# Patient Record
Sex: FEMALE | Race: WHITE
[De-identification: names, ages, dates, MRNs, and addresses within clinical notes are randomized per-mention and may not be internally consistent; named-entity substitution may affect disease eponyms.]

---

## 2023-04-17 PROBLEM — Z00.00 ENCOUNTER FOR PREVENTIVE HEALTH EXAMINATION: Status: ACTIVE | Noted: 2023-04-17

## 2023-04-20 ENCOUNTER — APPOINTMENT (OUTPATIENT)
Dept: UROLOGY | Facility: CLINIC | Age: 68
End: 2023-04-20
Payer: COMMERCIAL

## 2023-04-20 VITALS
TEMPERATURE: 97.6 F | WEIGHT: 160 LBS | DIASTOLIC BLOOD PRESSURE: 84 MMHG | BODY MASS INDEX: 30.21 KG/M2 | SYSTOLIC BLOOD PRESSURE: 130 MMHG | HEIGHT: 61 IN | HEART RATE: 86 BPM

## 2023-04-20 PROCEDURE — 99204 OFFICE O/P NEW MOD 45 MIN: CPT

## 2023-04-20 NOTE — HISTORY OF PRESENT ILLNESS
[FreeTextEntry1] : Patient Name: Romana Rosales\par Date of Birth: [birth year]\par Contact Number: [phone]\par ------------------------------------------------------------------------------\par Date of Initial Visit: 23\par Referring Provider/PCP: Dr. Jason Dillard (f. [phone])\par ------------------------------------------------------------------------------\par CC: Microhematuria \par \par History of Present Illness: ROMANA ROSALES is a 68 year old female presents for evaluation of microhematuria. Noted to have 3-10 RBC/HPF on UA in 2023. Negative leukocytes, bacteria and crystals (called PCP office to obtain results, was given verbal). Patient denies any gross hematuria. No change in urinary symptoms, occasional non-bothersome frequency at baseline.\par \par Risk Factors -  \par Cigarette smoking: social cigarette for 2 years as teenage (max 1 cigarette/day)\par History of gross hematuria: no\par LUTS: occasional frequency\par History of kidney stones: no\par Recent UTI: no (though had BV and yeast in vagina a few months ago)\par Recent trauma or strenuous activity: no\par History of occupational exposures (chemical benzene or aromatic amines): no\par Personal history of cancer:  no\par History of cyclophosphamide/ifosfamide chemotherapy: no\par History of pelvic radiation: no\par History of chronic indwelling catheters: no\par Family history of urothelial or other genitourinary cancers: father with prostate cancer, sister breast cancer in 60s treated\par \par \par PMH: anxiety, HLD\par PSH:  x 2\par Family History: father prostate cancer, sister breast cancer 60s, alive\par Social: , , 2 children - 1 passed away from OD, social smoker x 2 years as teenager, no alcohol, no recreational drugs \par Meds: Wellbutrin, rosuvastatin, Zoloft\par Allergies: NKDA\par ROS: no fevers, chills, weight loss

## 2023-04-20 NOTE — ASSESSMENT
[FreeTextEntry1] : \par \par JAYCE LAMAR is a 68 year old female with asymptomatic microhematuria. Per AUA guidelines, the patient is high risk given age.  \par \par  I explained that the likelihood of harboring a urologic malignancy in patients with microscopic hematuria is less than 5%, and this risk level varies considerably based on associated risk factors. We discussed possible etiologies of hematuria including benign (nephrolithiasis, UTI, medical renal disease) and malignant (urothelial cell carcinoma or kidneys, bladder, ureter, renal mass). We discussed risk factors and risk stratification of hematuria. We will proceed with work-up with CT urogram and cystoscopy.\par \par - UA\par - urine culture\par - CT urogram\par - cystoscopy

## 2023-04-20 NOTE — LETTER BODY
[Dear  ___] : Dear  [unfilled], [Courtesy Letter:] : I had the pleasure of seeing your patient, [unfilled], in my office today. [Please see my note below.] : Please see my note below. [Consult Closing:] : Thank you very much for allowing me to participate in the care of this patient.  If you have any questions, please do not hesitate to contact me. [Sincerely,] : Sincerely, [FreeTextEntry3] : Savana Rodriguez MD\par Director of Robotic Education\par [facility]\par \par [email or web address]\par [phone] ([place])\par [phone]  ([facility])

## 2023-04-21 LAB
APPEARANCE: CLEAR
BACTERIA: NEGATIVE /HPF
BILIRUBIN URINE: NEGATIVE
BLOOD URINE: NEGATIVE
CAST: 0 /LPF
COLOR: YELLOW
EPITHELIAL CELLS: 7 /HPF
GLUCOSE QUALITATIVE U: NEGATIVE MG/DL
KETONES URINE: NEGATIVE MG/DL
LEUKOCYTE ESTERASE URINE: ABNORMAL
MICROSCOPIC-UA: NORMAL
NITRITE URINE: NEGATIVE
PH URINE: 6.5
PROTEIN URINE: NEGATIVE MG/DL
RED BLOOD CELLS URINE: 0 /HPF
SPECIFIC GRAVITY URINE: 1.01
UROBILINOGEN URINE: 0.2 MG/DL
WHITE BLOOD CELLS URINE: 1 /HPF

## 2023-04-24 LAB — BACTERIA UR CULT: NORMAL

## 2023-05-11 ENCOUNTER — NON-APPOINTMENT (OUTPATIENT)
Age: 68
End: 2023-05-11

## 2023-05-18 ENCOUNTER — APPOINTMENT (OUTPATIENT)
Dept: UROLOGY | Facility: CLINIC | Age: 68
End: 2023-05-18
Payer: COMMERCIAL

## 2023-05-18 ENCOUNTER — TRANSCRIPTION ENCOUNTER (OUTPATIENT)
Age: 68
End: 2023-05-18

## 2023-05-18 PROCEDURE — 52000 CYSTOURETHROSCOPY: CPT

## 2023-05-18 PROCEDURE — 99214 OFFICE O/P EST MOD 30 MIN: CPT | Mod: 57,25

## 2023-05-18 NOTE — ASSESSMENT
[FreeTextEntry1] : JAYCE LAMAR is a 68 year old female with asymptomatic microhematuria. Per AUA guidelines, the patient is high risk given age. Work-up pursued.\par \par CTU negative for cause microhematuria. Cysto today showed some focal areas of erythema. Discussed unclear significance of lesion. Would proceed with biopsy for further work-up. We discussed the risks of biopsy including bleeding, infection, injury to surrounding structures including ureteral orifice. Discussed risk of perforation including intraperitoneal and extraperitoneal perforation and implications of each. Discussed possible need for brown catheter after surgery - and possibility of prolonged course of catheter post-operative. Discussed risks of anesthesia, DVT/PE. Discussed role of re-biopsy/resection and associated guidelines. Patient would like to proceed with biopsy.\par \par - preop labs \par - surgical scheduler to contact patient\par - will need medical clearance\par - will plan for 5/12

## 2023-05-18 NOTE — HISTORY OF PRESENT ILLNESS
[FreeTextEntry1] : Patient Name: Romana Rosales\par Date of Birth: [birth year]\par Contact Number: [phone]\par ------------------------------------------------------------------------------\par Date of Initial Visit: 23\par Referring Provider/PCP: Dr. Jason Dillard (f. [phone])\par ------------------------------------------------------------------------------\par CC: Microhematuria \par \par History of Present Illness: ROMANA ROSALES is a 68 year old female presents for evaluation of microhematuria. Noted to have 3-10 RBC/HPF on UA in 2023. Negative leukocytes, bacteria and crystals (called PCP office to obtain results, was given verbal). Patient denies any gross hematuria. No change in urinary symptoms, occasional non-bothersome frequency at baseline.\par \par Risk Factors - \par Cigarette smoking: social cigarette for 2 years as teenage (max 1 cigarette/day)\par History of gross hematuria: no\par LUTS: occasional frequency\par History of kidney stones: no\par Recent UTI: no (though had BV and yeast in vagina a few months ago)\par Recent trauma or strenuous activity: no\par History of occupational exposures (chemical benzene or aromatic amines): no\par Personal history of cancer: no\par History of cyclophosphamide/ifosfamide chemotherapy: no\par History of pelvic radiation: no\par History of chronic indwelling catheters: no\par Family history of urothelial or other genitourinary cancers: father with prostate cancer, sister breast cancer in 60s treated\par ----------------------------------------------------------------------------------------------------------------------------------------\par Interval History (2023):\par \par 23:\par UA 0 RBC, nitrite neg, trace LE; U Cx <10 K africa\par \par \par Cysto today: area of erythema posterior bladder wall and left lateral wall - not velvety but focal patches\par \par CTU at [facility] 23:\par FINDINGS: ABDOMEN/PELVIS\par LIVER: Unremarkable.\par BILIARY: Unremarkable.\par PANCREAS: Unremarkable.\par SPLEEN: Unremarkable.\par ADRENAL GLANDS: Unremarkable.\par KIDNEYS: Unremarkable.\par URETERS: Unremarkable.\par URINARY BLADDER: Unremarkable.\par REPRODUCTIVE ORGANS: Unremarkable.\par STOMACH: Unremarkable.\par SMALL BOWEL: Unremarkable.\par LARGE BOWEL: There is a significant amount of retained colonic stool. The appendix appears unremarkable.\par PERITONEUM: Unremarkable.\par LYMPH NODES: Unremarkable.\par VASCULATURE: There are vascular calcifications.\par SOFT TISSUES: Unremarkable.\par BONES: Mild to moderate multilevel endplate osteophyte formation. L5/S1 intervertebral disc lucency compatible with vacuum phenomena from disc degeneration. L3 vertebral body focus of sclerosis compatible with a bone island.\par \par IMPRESSION:  \par Degenerative changes of the spine.\par No hydronephrosis or radiopaque obstructing  tract calculus.\par \par \par ---------------------------------------------------------------------------------------------------------------------------------------- \par \par PMH: anxiety, HLD\par PSH:  x 2\par Family History: father prostate cancer, sister breast cancer 60s, alive\par Social: , , 2 children - 1 passed away from OD, social smoker x 2 years as teenager, no alcohol, no recreational drugs \par Meds: Wellbutrin, rosuvastatin, Zoloft\par Allergies: NKDA\par ROS: no fevers, chills, weight loss \par

## 2023-05-18 NOTE — LETTER BODY
[Dear  ___] : Dear  [unfilled], [Courtesy Letter:] : I had the pleasure of seeing your patient, [unfilled], in my office today. [Please see my note below.] : Please see my note below. [Consult Closing:] : Thank you very much for allowing me to participate in the care of this patient.  If you have any questions, please do not hesitate to contact me. [Sincerely,] : Sincerely, [FreeTextEntry3] : Savana Rodriguez MD\par Director of Robotic Education\par [facility]\par \par [email or web address]\par [phone] ([place])\par [phone]  ([facility])

## 2023-05-19 LAB
ALBUMIN SERPL ELPH-MCNC: 4.7 G/DL
ALP BLD-CCNC: 86 U/L
ALT SERPL-CCNC: 16 U/L
ANION GAP SERPL CALC-SCNC: 11 MMOL/L
APPEARANCE: CLEAR
APTT BLD: 34.8 SEC
AST SERPL-CCNC: 20 U/L
BACTERIA: NEGATIVE /HPF
BILIRUB SERPL-MCNC: 0.3 MG/DL
BILIRUBIN URINE: NEGATIVE
BLOOD URINE: NEGATIVE
BUN SERPL-MCNC: 23 MG/DL
CALCIUM SERPL-MCNC: 10 MG/DL
CAST: 0 /LPF
CHLORIDE SERPL-SCNC: 102 MMOL/L
CO2 SERPL-SCNC: 30 MMOL/L
COLOR: YELLOW
CREAT SERPL-MCNC: 0.76 MG/DL
EGFR: 86 ML/MIN/1.73M2
EPITHELIAL CELLS: 4 /HPF
GLUCOSE QUALITATIVE U: NEGATIVE MG/DL
GLUCOSE SERPL-MCNC: 103 MG/DL
HCT VFR BLD CALC: 43.9 %
HGB BLD-MCNC: 14.1 G/DL
INR PPP: 1.07 RATIO
KETONES URINE: NEGATIVE MG/DL
LEUKOCYTE ESTERASE URINE: ABNORMAL
MCHC RBC-ENTMCNC: 27.8 PG
MCHC RBC-ENTMCNC: 32.1 GM/DL
MCV RBC AUTO: 86.4 FL
MICROSCOPIC-UA: NORMAL
NITRITE URINE: POSITIVE
PH URINE: 7
PLATELET # BLD AUTO: 264 K/UL
POTASSIUM SERPL-SCNC: 4 MMOL/L
PROT SERPL-MCNC: 7.7 G/DL
PROTEIN URINE: NEGATIVE MG/DL
PT BLD: 12.4 SEC
RBC # BLD: 5.08 M/UL
RBC # FLD: 13.6 %
RED BLOOD CELLS URINE: 5 /HPF
REVIEW: NORMAL
SODIUM SERPL-SCNC: 143 MMOL/L
SPECIFIC GRAVITY URINE: 1.02
UROBILINOGEN URINE: 0.2 MG/DL
WBC # FLD AUTO: 6.09 K/UL
WHITE BLOOD CELLS URINE: 1 /HPF

## 2023-05-22 LAB
BACTERIA UR CULT: ABNORMAL
URINE CYTOLOGY: NORMAL

## 2023-05-22 RX ORDER — SULFAMETHOXAZOLE AND TRIMETHOPRIM 800; 160 MG/1; MG/1
800-160 TABLET ORAL TWICE DAILY
Qty: 10 | Refills: 0 | Status: ACTIVE | COMMUNITY
Start: 2023-05-22 | End: 1900-01-01

## 2023-05-24 ENCOUNTER — NON-APPOINTMENT (OUTPATIENT)
Age: 68
End: 2023-05-24

## 2023-06-21 ENCOUNTER — LABORATORY RESULT (OUTPATIENT)
Age: 68
End: 2023-06-21

## 2023-06-26 ENCOUNTER — NON-APPOINTMENT (OUTPATIENT)
Age: 68
End: 2023-06-26

## 2023-06-29 ENCOUNTER — APPOINTMENT (OUTPATIENT)
Dept: UROLOGY | Facility: CLINIC | Age: 68
End: 2023-06-29
Payer: COMMERCIAL

## 2023-06-29 PROCEDURE — 52000 CYSTOURETHROSCOPY: CPT

## 2023-06-29 NOTE — LETTER BODY
[Dear  ___] : Dear  [unfilled], [Courtesy Letter:] : I had the pleasure of seeing your patient, [unfilled], in my office today. [Please see my note below.] : Please see my note below. [Consult Closing:] : Thank you very much for allowing me to participate in the care of this patient.  If you have any questions, please do not hesitate to contact me. [Sincerely,] : Sincerely, [FreeTextEntry3] : Savana Rodriguez MD\par Director of Robotic Education\par [facility] at [facility]\par \par [email or web address]\par [phone] ([place])\par [phone]  ([facility])

## 2023-06-29 NOTE — HISTORY OF PRESENT ILLNESS
[FreeTextEntry1] : Patient Name: Romana Rosales\par Date of Birth: [birth year]\par Contact Number: [phone]\par ------------------------------------------------------------------------------\par Date of Initial Visit: 23\par Referring Provider/PCP: Dr. Jason Dillard (f. [phone])\par ------------------------------------------------------------------------------\par CC: Microhematuria \par \par History of Present Illness: ROMANA ROSALES is a 68 year old female presents for evaluation of microhematuria. Noted to have 3-10 RBC/HPF on UA in 2023. Negative leukocytes, bacteria and crystals (called PCP office to obtain results, was given verbal). Patient denies any gross hematuria. No change in urinary symptoms, occasional non-bothersome frequency at baseline.\par \par Risk Factors - \par Cigarette smoking: social cigarette for 2 years as teenage (max 1 cigarette/day)\par History of gross hematuria: no\par LUTS: occasional frequency\par History of kidney stones: no\par Recent UTI: no (though had BV and yeast in vagina a few months ago)\par Recent trauma or strenuous activity: no\par History of occupational exposures (chemical benzene or aromatic amines): no\par Personal history of cancer: no\par History of cyclophosphamide/ifosfamide chemotherapy: no\par History of pelvic radiation: no\par History of chronic indwelling catheters: no\par Family history of urothelial or other genitourinary cancers: father with prostate cancer, sister breast cancer in 60s treated\par ----------------------------------------------------------------------------------------------------------------------------------------\par Interval History (2023):\par \par 23:\par UA 0 RBC, nitrite neg, trace LE; U Cx <10 K africa\par \par \par Cysto today: area of erythema posterior bladder wall and left lateral wall - not velvety but focal patches\par \par CTU at R 23:\par FINDINGS: ABDOMEN/PELVIS\par LIVER: Unremarkable.\par BILIARY: Unremarkable.\par PANCREAS: Unremarkable.\par SPLEEN: Unremarkable.\par ADRENAL GLANDS: Unremarkable.\par KIDNEYS: Unremarkable.\par URETERS: Unremarkable.\par URINARY BLADDER: Unremarkable.\par REPRODUCTIVE ORGANS: Unremarkable.\par STOMACH: Unremarkable.\par SMALL BOWEL: Unremarkable.\par LARGE BOWEL: There is a significant amount of retained colonic stool. The appendix appears unremarkable.\par PERITONEUM: Unremarkable.\par LYMPH NODES: Unremarkable.\par VASCULATURE: There are vascular calcifications.\par SOFT TISSUES: Unremarkable.\par BONES: Mild to moderate multilevel endplate osteophyte formation. L5/S1 intervertebral disc lucency compatible with vacuum phenomena from disc degeneration. L3 vertebral body focus of sclerosis compatible with a bone island.\par \par IMPRESSION: \par Degenerative changes of the spine.\par No hydronephrosis or radiopaque obstructing  tract calculus.\par ----------------------------------------------------------------------------------------------------------------------------------------\par Interval History (2023):\par \par Urine cytology negative.\par Urine culture at time of cystoscopy positive for e. coli - was sent given plan for OR (culture prior had been negative). We had discussed that the area of erythema may have been secondary to presence of bacteria. After discussion risks and benefits, decision made to treat with course of antibiotics. Completed course of Bactrim and repeat urine culture prior to cystoscopy negative. UA showed 0 RBC.\par \par Cystoscopy today showed resolution of erythematous patches, normal cystoscopy.\par \par ---------------------------------------------------------------------------------------------------------------------------------------- \par \par PMH: anxiety, HLD\par PSH:  x 2\par Family History: father prostate cancer, sister breast cancer 60s, alive\par Social: , , 2 children - 1 passed away from OD, social smoker x 2 years as teenager, no alcohol, no recreational drugs \par Meds: Wellbutrin, rosuvastatin, Zoloft\par Allergies: NKDA\par ROS: no fevers, chills, weight loss \par

## 2023-06-29 NOTE — ASSESSMENT
[FreeTextEntry1] : JAYCE LAMAR is a 68 year old female with asymptomatic microhematuria. Work-up pursued.\par \par CTU negative for cause microhematuria. Initial cysto today showed some focal areas of erythema of unclear significance. Urine culture returned with evidence of infection. Infection treated and repeat cystoscopy performed which showed resolution of erythema - cysto wnl.\par \par - UA in 6 months\par - f/u 6 months\par \par

## 2023-12-27 ENCOUNTER — LABORATORY RESULT (OUTPATIENT)
Age: 68
End: 2023-12-27

## 2023-12-29 LAB
APPEARANCE: CLEAR
BACTERIA UR CULT: NORMAL
BILIRUBIN URINE: NEGATIVE
BLOOD URINE: NEGATIVE
COLOR: YELLOW
GLUCOSE QUALITATIVE U: NEGATIVE MG/DL
KETONES URINE: NEGATIVE MG/DL
LEUKOCYTE ESTERASE URINE: NEGATIVE
NITRITE URINE: NEGATIVE
PH URINE: 7
PROTEIN URINE: NEGATIVE MG/DL
SPECIFIC GRAVITY URINE: 1.01
UROBILINOGEN URINE: 0.2 MG/DL

## 2024-01-03 ENCOUNTER — APPOINTMENT (OUTPATIENT)
Dept: UROLOGY | Facility: CLINIC | Age: 69
End: 2024-01-03
Payer: COMMERCIAL

## 2024-01-03 VITALS — TEMPERATURE: 96.6 F | HEART RATE: 66 BPM | SYSTOLIC BLOOD PRESSURE: 122 MMHG | DIASTOLIC BLOOD PRESSURE: 75 MMHG

## 2024-01-03 DIAGNOSIS — R31.29 OTHER MICROSCOPIC HEMATURIA: ICD-10-CM

## 2024-01-03 PROCEDURE — 99213 OFFICE O/P EST LOW 20 MIN: CPT

## 2024-01-03 NOTE — HISTORY OF PRESENT ILLNESS
[FreeTextEntry1] : Patient Name: Romana Rosales Date of Birth: [birth year] Contact Number: [phone] ------------------------------------------------------------------------------ Date of Initial Visit: 23 Referring Provider/PCP: Dr. Jason Dillard (f. [phone]) ------------------------------------------------------------------------------ CC: Microhematuria  History of Present Illness: ROMANA ROSALES is a 68 year old female presents for evaluation of microhematuria. Noted to have 3-10 RBC/HPF on UA in 2023. Negative leukocytes, bacteria and crystals (called PCP office to obtain results, was given verbal). Patient denies any gross hematuria. No change in urinary symptoms, occasional non-bothersome frequency at baseline.  Risk Factors - Cigarette smoking: social cigarette for 2 years as teenage (max 1 cigarette/day) History of gross hematuria: no LUTS: occasional frequency History of kidney stones: no Recent UTI: no (though had BV and yeast in vagina a few months ago) Recent trauma or strenuous activity: no History of occupational exposures (chemical benzene or aromatic amines): no Personal history of cancer: no History of cyclophosphamide/ifosfamide chemotherapy: no History of pelvic radiation: no History of chronic indwelling catheters: no Family history of urothelial or other genitourinary cancers: father with prostate cancer, sister breast cancer in 60s treated ---------------------------------------------------------------------------------------------------------------------------------------- Interval History (2023):  23: UA 0 RBC, nitrite neg, trace LE; U Cx <10 K [place]  Cysto today: area of erythema posterior bladder wall and left lateral wall - not velvety but focal patches  CTU at [facility] 23: FINDINGS: ABDOMEN/PELVIS LIVER: Unremarkable. BILIARY: Unremarkable. PANCREAS: Unremarkable. SPLEEN: Unremarkable. ADRENAL GLANDS: Unremarkable. KIDNEYS: Unremarkable. URETERS: Unremarkable. URINARY BLADDER: Unremarkable. REPRODUCTIVE ORGANS: Unremarkable. STOMACH: Unremarkable. SMALL BOWEL: Unremarkable. LARGE BOWEL: There is a significant amount of retained colonic stool. The appendix appears unremarkable. PERITONEUM: Unremarkable. LYMPH NODES: Unremarkable. VASCULATURE: There are vascular calcifications. SOFT TISSUES: Unremarkable. BONES: Mild to moderate multilevel endplate osteophyte formation. L5/S1 intervertebral disc lucency compatible with vacuum phenomena from disc degeneration. L3 vertebral body focus of sclerosis compatible with a bone island.  IMPRESSION: Degenerative changes of the spine. No hydronephrosis or radiopaque obstructing  tract calculus. ---------------------------------------------------------------------------------------------------------------------------------------- Interval History (2023):  Urine cytology negative.  Urine culture at time of cystoscopy positive for e. coli - was sent given plan for OR (culture prior had been negative). We had discussed that the area of erythema may have been secondary to presence of bacteria. After discussion risks and benefits, decision made to treat with course of antibiotics. Completed course of Bactrim and repeat urine culture prior to cystoscopy negative. UA showed 0 RBC.  Cystoscopy today showed resolution of erythematous patches, normal cystoscopy. ---------------------------------------------------------------------------------------------------------------------------------------- Interval History (2024):  Had labs at [facility] 23: UA: 3-10 RBC, 6-10 squamous cells  Repeated UA here 23: nitrite neg LE neg 0 RBC  Patient reports no urinary issues over this time. No gross hematuria or symptoms of infection. No flank pain, chronic back pain. ---------------------------------------------------------------------------------------------------------------------------------------- PMH: anxiety, HLD PSH:  x 2 Family History: father prostate cancer, sister breast cancer 60s, alive Social: , , 2 children - 1 passed away from OD, social smoker x 2 years as teenager, no alcohol, no recreational drugs Meds: Wellbutrin, rosuvastatin, Zoloft Allergies: NKDA ROS: no fevers, chills, weight loss
Yes

## 2024-01-03 NOTE — ASSESSMENT
[FreeTextEntry1] : JAYCE LAMAR is a 68 year old female with asymptomatic microhematuria. Work-up pursued.  CTU negative for cause microhematuria. Initial cysto 5/2023 showed some focal areas of erythema of unclear significance. Urine culture returned with evidence of infection. Infection treated and repeat cystoscopy performed 6/29/23 which showed resolution of erythema - cysto wnl. Patient had repeat UA at PCP which showed 3-10 RBC (also +epithelial cells). Had repeat here prior to appt and 0 RBC present.  We discussed possibility of persistence of microhematuria, but reassuring that resolved here. We discussed negative work-up in past and questionable initial cysto. We discussed option of repeating cysto now versus observation over this time and risks and benefits of each approach. With SDM, patient would like to hold off on repeat cysto now. Understands small risk of missing malignancy present but is comfortable with risk. We will plan to repeat UA in 3 months.  - UA in 3 months - fu 3 months - patient to notify me if any changes in urination, gross hematuria

## 2024-01-03 NOTE — LETTER BODY
[Dear  ___] : Dear  [unfilled], [Courtesy Letter:] : I had the pleasure of seeing your patient, [unfilled], in my office today. [Please see my note below.] : Please see my note below. [Consult Closing:] : Thank you very much for allowing me to participate in the care of this patient.  If you have any questions, please do not hesitate to contact me. [Sincerely,] : Sincerely, [FreeTextEntry3] : Savana Rodriguez MD Director of Robotic Education [facility]   [email or web address] [phone]

## 2024-04-03 ENCOUNTER — APPOINTMENT (OUTPATIENT)
Dept: UROLOGY | Facility: CLINIC | Age: 69
End: 2024-04-03

## 2024-09-25 DIAGNOSIS — R31.29 OTHER MICROSCOPIC HEMATURIA: ICD-10-CM

## 2024-10-01 ENCOUNTER — APPOINTMENT (OUTPATIENT)
Dept: UROLOGY | Facility: CLINIC | Age: 69
End: 2024-10-01
Payer: COMMERCIAL

## 2024-10-01 VITALS — SYSTOLIC BLOOD PRESSURE: 108 MMHG | HEART RATE: 86 BPM | DIASTOLIC BLOOD PRESSURE: 72 MMHG | TEMPERATURE: 95.7 F

## 2024-10-01 DIAGNOSIS — N39.0 URINARY TRACT INFECTION, SITE NOT SPECIFIED: ICD-10-CM

## 2024-10-01 PROCEDURE — 99214 OFFICE O/P EST MOD 30 MIN: CPT

## 2024-10-01 PROCEDURE — 51798 US URINE CAPACITY MEASURE: CPT

## 2024-10-01 RX ORDER — ESTRADIOL 0.1 MG/G
0.1 CREAM VAGINAL
Qty: 1 | Refills: 2 | Status: ACTIVE | COMMUNITY
Start: 2024-10-01 | End: 1900-01-01

## 2024-10-01 NOTE — LETTER BODY
[Dear  ___] : Dear  [unfilled], [Courtesy Letter:] : I had the pleasure of seeing your patient, [unfilled], in my office today. [Please see my note below.] : Please see my note below. [Consult Closing:] : Thank you very much for allowing me to participate in the care of this patient.  If you have any questions, please do not hesitate to contact me. [Sincerely,] : Sincerely, [FreeTextEntry3] : Savana Rodriguez MD Director of Robotic Education The [facility]   [email or web address] [phone]

## 2024-10-01 NOTE — HISTORY OF PRESENT ILLNESS
[FreeTextEntry1] : Patient Name: Romana Rosales Date of Birth: [birth year] Contact Number: [phone] ------------------------------------------------------------------------------ Date of Initial Visit: 23 Referring Provider/PCP: Dr. Jason Dillard (f. [phone]) ------------------------------------------------------------------------------ CC: Microhematuria  History of Present Illness: ROMANA ROSALES is a 68 year old female presents for evaluation of microhematuria. Noted to have 3-10 RBC/HPF on UA in 2023. Negative leukocytes, bacteria and crystals (called PCP office to obtain results, was given verbal). Patient denies any gross hematuria. No change in urinary symptoms, occasional non-bothersome frequency at baseline.  Risk Factors - Cigarette smoking: social cigarette for 2 years as teenage (max 1 cigarette/day) History of gross hematuria: no LUTS: occasional frequency History of kidney stones: no Recent UTI: no (though had BV and yeast in vagina a few months ago) Recent trauma or strenuous activity: no History of occupational exposures (chemical benzene or aromatic amines): no Personal history of cancer: no History of cyclophosphamide/ifosfamide chemotherapy: no History of pelvic radiation: no History of chronic indwelling catheters: no Family history of urothelial or other genitourinary cancers: father with prostate cancer, sister breast cancer in 60s treated ---------------------------------------------------------------------------------------------------------------------------------------- Interval History (2023):  23: UA 0 RBC, nitrite neg, trace LE; U Cx <10 K [place]  Cysto today: area of erythema posterior bladder wall and left lateral wall - not velvety but focal patches  CTU at [facility] 23: FINDINGS: ABDOMEN/PELVIS LIVER: Unremarkable. BILIARY: Unremarkable. PANCREAS: Unremarkable. SPLEEN: Unremarkable. ADRENAL GLANDS: Unremarkable. KIDNEYS: Unremarkable. URETERS: Unremarkable. URINARY BLADDER: Unremarkable. REPRODUCTIVE ORGANS: Unremarkable. STOMACH: Unremarkable. SMALL BOWEL: Unremarkable. LARGE BOWEL: There is a significant amount of retained colonic stool. The appendix appears unremarkable. PERITONEUM: Unremarkable. LYMPH NODES: Unremarkable. VASCULATURE: There are vascular calcifications. SOFT TISSUES: Unremarkable. BONES: Mild to moderate multilevel endplate osteophyte formation. L5/S1 intervertebral disc lucency compatible with vacuum phenomena from disc degeneration. L3 vertebral body focus of sclerosis compatible with a bone island.  IMPRESSION: Degenerative changes of the spine. No hydronephrosis or radiopaque obstructing  tract calculus. ---------------------------------------------------------------------------------------------------------------------------------------- Interval History (2023):  Urine cytology negative.  Urine culture at time of cystoscopy positive for e. coli - was sent given plan for OR (culture prior had been negative). We had discussed that the area of erythema may have been secondary to presence of bacteria. After discussion risks and benefits, decision made to treat with course of antibiotics. Completed course of Bactrim and repeat urine culture prior to cystoscopy negative. UA showed 0 RBC.  Cystoscopy today showed resolution of erythematous patches, normal cystoscopy. ---------------------------------------------------------------------------------------------------------------------------------------- Interval History (2024):  Had labs at [facility] 23: UA: 3-10 RBC, 6-10 squamous cells  Repeated UA here 23: nitrite neg LE neg 0 RBC  Patient reports no urinary issues over this time. No gross hematuria or symptoms of infection. No flank pain, chronic back pain. ---------------------------------------------------------------------------------------------------------------------------------------- Interval History (10/01/2024):  Patient reports since last visit has multiple UTIs. 3 since January. Symptoms burning after urination - usually it is the urination and night and in AM, but starts to feel better as hydrates during the day. Symptoms only go away with antibiotics. Occurs about every 3-4 months. Symptoms back to baseline with antibiotics and reports has done tests of cure. Had symptom last week and urine culture was positive for e. coli - started Bactrim last night with improvement.  No associated gross hematuria, no fevers, chills, flank pain. Denies any obvious triggers. Not sexually active. Patient reports intermittent constipation, + vaginal dryness.  Urine culture 24: >100K Morganella Morganii - sensitives scanned  Urine culture 24: 50-100K e. coli  - sensitives scanned   PVR (to ensure adequate emptying): 2 ------------------------------------------------------------------------------------------------------------------------------------------------------------------------------------------ PMH: anxiety, HLD PSH:  x 2 Family History: father prostate cancer, sister breast cancer 60s, alive Social: , , 2 children - 1 passed away from OD, social smoker x 2 years as teenager, no alcohol, no recreational drugs Meds: Wellbutrin, rosuvastatin, Zoloft Allergies: NKDA ROS: no fevers, chills, weight loss

## 2024-10-01 NOTE — ASSESSMENT
[FreeTextEntry1] : JAYCE LAMAR is a 68 year old female with asymptomatic microhematuria. Work-up pursued.  CTU negative for cause microhematuria. Initial cysto 5/2023 showed some focal areas of erythema of unclear significance. Urine culture returned with evidence of infection. Infection treated and repeat cystoscopy performed 6/29/23 which showed resolution of erythema - cysto wnl. Patient had repeat UA at PCP which showed 3-10 RBC (also +epithelial cells). Had repeat here prior to appt and 0 RBC present.  We discussed possibility of persistence of microhematuria, but reassuring that resolved here. We discussed negative work-up in past and questionable initial cysto. We discussed option of repeating cysto now versus observation over this time and risks and benefits of each approach. With SDM, patient would like to hold off on repeat cysto now. Understands small risk of missing malignancy present but is comfortable with risk. Initial plan to repeat UA in 3 months.  In interim, developed recurrent UTIs. Recurrent UTI is defined as two episodes of acute bacterial cystitis within six months or three episodes within one year. Patient meets criteria. We discussed the natural history of UTIs and strategies to prevent incidence of UTIs. We discussed the data related to increase water intake and cranberry extract daily. We discussed antibiotic prophylaxis both continuous and post-coital. Discussed data related to vaginal estrogen. Discussed role of work-up and same as  work-up - given work-up last year which no revealing etiology UTI will hold off for now.  Plan: - UA at least one month after abx given history microhematuria - estrace cream - cranberry pills and probiotics - constipation management - patient to notify me if any changes in urination, gross hematuria - fu 3 months - discussed role of work-up - did CTU and cysto last year and no etiology - will hold off on repeat for now

## 2024-10-30 DIAGNOSIS — R31.29 OTHER MICROSCOPIC HEMATURIA: ICD-10-CM

## 2025-01-24 ENCOUNTER — APPOINTMENT (OUTPATIENT)
Dept: UROLOGY | Facility: CLINIC | Age: 70
End: 2025-01-24
Payer: COMMERCIAL

## 2025-01-24 VITALS
WEIGHT: 168 LBS | BODY MASS INDEX: 31.72 KG/M2 | TEMPERATURE: 97.3 F | DIASTOLIC BLOOD PRESSURE: 71 MMHG | HEART RATE: 81 BPM | SYSTOLIC BLOOD PRESSURE: 113 MMHG | OXYGEN SATURATION: 96 % | HEIGHT: 61 IN

## 2025-01-24 DIAGNOSIS — N39.0 URINARY TRACT INFECTION, SITE NOT SPECIFIED: ICD-10-CM

## 2025-01-24 PROCEDURE — 99214 OFFICE O/P EST MOD 30 MIN: CPT

## 2025-01-27 LAB
APPEARANCE: CLEAR
BACTERIA UR CULT: NORMAL
BACTERIA: NEGATIVE /HPF
BILIRUBIN URINE: NEGATIVE
BLOOD URINE: NEGATIVE
CAST: 0 /LPF
COLOR: YELLOW
EPITHELIAL CELLS: 0 /HPF
GLUCOSE QUALITATIVE U: NEGATIVE MG/DL
KETONES URINE: NEGATIVE MG/DL
LEUKOCYTE ESTERASE URINE: NEGATIVE
MICROSCOPIC-UA: NORMAL
NITRITE URINE: NEGATIVE
PH URINE: 6.5
PROTEIN URINE: NEGATIVE MG/DL
RED BLOOD CELLS URINE: 0 /HPF
SPECIFIC GRAVITY URINE: 1.02
UROBILINOGEN URINE: 0.2 MG/DL
WHITE BLOOD CELLS URINE: 0 /HPF